# Patient Record
Sex: FEMALE | Race: OTHER | URBAN - METROPOLITAN AREA
[De-identification: names, ages, dates, MRNs, and addresses within clinical notes are randomized per-mention and may not be internally consistent; named-entity substitution may affect disease eponyms.]

---

## 2024-06-21 ENCOUNTER — NURSE TRIAGE (OUTPATIENT)
Dept: FAMILY MEDICINE CLINIC | Facility: CLINIC | Age: 19
End: 2024-06-21

## 2024-06-21 NOTE — TELEPHONE ENCOUNTER
Action Requested: Summary for Provider     []  Critical Lab, Recommendations Needed  [] Need Additional Advice  []   FYI    []   Need Orders  [] Need Medications Sent to Pharmacy  []  Other     SUMMARY: Office visit.   Future Appointments   Date Time Provider Department Center   6/27/2024  3:40 PM Dar Young APRN [facility]       Reason for call: Breathing Problem  Onset: Data Unavailable    Patient states that she has had random pain that is in her lungs for the last year. The pain comes and goes. She denies chest pain, shortness of breath, severe pain. Currently she denies symptoms.     Reason for Disposition   Patient wants to be seen    Protocols used: Breathing Difficulty-A-[place]

## 2024-06-21 NOTE — TELEPHONE ENCOUNTER
New patient scheduled an appointment on line noting the following:    I have been experiencing pain in the lungs when breathing and I want to get it checked out.     Left message to call back.  Mychart not set up.

## 2024-06-27 ENCOUNTER — OFFICE VISIT (OUTPATIENT)
Dept: FAMILY MEDICINE CLINIC | Facility: CLINIC | Age: 19
End: 2024-06-27

## 2024-06-27 VITALS
OXYGEN SATURATION: 98 % | BODY MASS INDEX: 18.43 KG/M2 | WEIGHT: 104 LBS | HEART RATE: 76 BPM | HEIGHT: 63 IN | DIASTOLIC BLOOD PRESSURE: 71 MMHG | SYSTOLIC BLOOD PRESSURE: 118 MMHG

## 2024-06-27 DIAGNOSIS — R10.12 LUQ PAIN: ICD-10-CM

## 2024-06-27 DIAGNOSIS — R07.81 PLEURITIC CHEST PAIN: Primary | ICD-10-CM

## 2024-06-27 DIAGNOSIS — R07.81 RIB PAIN ON LEFT SIDE: ICD-10-CM

## 2024-06-27 PROCEDURE — 99204 OFFICE O/P NEW MOD 45 MIN: CPT

## 2024-06-27 PROCEDURE — 3078F DIAST BP <80 MM HG: CPT

## 2024-06-27 PROCEDURE — 3008F BODY MASS INDEX DOCD: CPT

## 2024-06-27 PROCEDURE — 3074F SYST BP LT 130 MM HG: CPT

## 2024-06-27 NOTE — ASSESSMENT & PLAN NOTE
Left upper quadrant pain  Assess pancreas  Does not radiate to back  Does not feel like burning it is a sharp pain.  Does not get worse when laying flat.  Workup pending

## 2024-06-27 NOTE — ASSESSMENT & PLAN NOTE
Pleuritic-like chest pain is intermittent and not present on exam.  Patient reports has been happening for over a year happens with no rhyme or reason.  Patient reports she has sharp pain in the left lower ribs on last 2 ribs near costal cartilage above lung and stomach.  Not reproducible on exam  Denies use of OCPs, denies chest pain and shortness of breath  Denies issues with urination and blood  Will assess CBC, CMP, lipase, chest x-ray and D-dimer  Will await results for further recommendations

## 2024-06-27 NOTE — PROGRESS NOTES
Subjective:   Rin Garcia is a 19 year old female who presents for Lung Problem (Lung pain , past 1 year , on and off, left side behind her rib, when inhale is worse and hard to breathe, theres no reason for the cause of the pain that she remembers)   Patient is a pleasant 19-year-old female with no documented past medical history.  Patient is new to office today to provider.  Patient coming today to establish care and be evaluated for intermittent lung pain x 1 year.  Patient states she has lung pain on and off on left side. Below her rib. This has been going on for over one year. When it comes it hurts to breath.It doesn't go away until she can fully inhale. She can not associate it with anything. Mom has told her to have it looked at. No trauma, no fall, no injury of any sort. She is not on OCPs, she does not smoke. Pain does not happen with every breath. She cannot identify any associated factor. She denies back pain, denies bladder/urination issues, not associated with menses. Not reproducible on exam and not present on exam. Denies cough, denies wheeze, denies chest pain.     PMH: Depression. Does not take meds. Was on zoloft at 13 years.   PSH: No hx of surgeries.   Meds: none.         Past Medical History:    Depression      History reviewed. No pertinent surgical history.     History/Other:    Chief Complaint Reviewed and Verified  Nursing Notes Reviewed and   Verified  Tobacco Reviewed  Allergies Reviewed  Medications Reviewed    Problem List Reviewed  Medical History Reviewed  Surgical History   Reviewed  OB Status Reviewed  Family History Reviewed  Social History   Reviewed         Tobacco:  She has never smoked tobacco.    No current outpatient medications on file.         Review of Systems:  Review of Systems   Constitutional: Negative.  Negative for activity change, appetite change, chills and fever.   HENT: Negative.  Negative for congestion, postnasal drip, rhinorrhea, sore throat,  tinnitus and voice change.    Eyes: Negative.    Respiratory:  Positive for chest tightness. Negative for apnea, cough and shortness of breath.    Cardiovascular:  Negative for chest pain and leg swelling.   Gastrointestinal: Negative.  Negative for abdominal pain, anal bleeding, blood in stool, constipation, diarrhea, nausea and vomiting.   Genitourinary: Negative.  Negative for difficulty urinating, flank pain and menstrual problem.   Musculoskeletal: Negative.  Negative for joint swelling.   Skin: Negative.    Neurological: Negative.  Negative for dizziness and headaches.   Psychiatric/Behavioral: Negative.  Negative for agitation.          Objective:   /71 (BP Location: Left arm, Patient Position: Sitting, Cuff Size: adult)   Pulse 76   Ht 5' 3\" (1.6 m)   Wt 104 lb (47.2 kg)   LMP 06/02/2024   SpO2 98%   BMI 18.42 kg/m²  Estimated body mass index is 18.42 kg/m² as calculated from the following:    Height as of this encounter: 5' 3\" (1.6 m).    Weight as of this encounter: 104 lb (47.2 kg).  Physical Exam  Vitals and nursing note reviewed.   Constitutional:       General: She is not in acute distress.     Appearance: Normal appearance. She is well-developed.   HENT:      Head: Normocephalic and atraumatic.      Right Ear: Tympanic membrane, ear canal and external ear normal.      Left Ear: Tympanic membrane, ear canal and external ear normal.      Nose: Rhinorrhea present. No congestion.      Mouth/Throat:      Mouth: Mucous membranes are moist.      Pharynx: Oropharynx is clear. No oropharyngeal exudate or posterior oropharyngeal erythema.   Eyes:      Conjunctiva/sclera: Conjunctivae normal.      Pupils: Pupils are equal, round, and reactive to light.   Neck:      Thyroid: No thyromegaly.   Cardiovascular:      Rate and Rhythm: Normal rate and regular rhythm.      Pulses: Normal pulses.      Heart sounds: Normal heart sounds. No murmur heard.  Pulmonary:      Effort: Pulmonary effort is normal. No  respiratory distress.      Breath sounds: Normal breath sounds. No wheezing or rales.   Chest:      Chest wall: No tenderness.   Abdominal:      General: Bowel sounds are normal. There is no distension.      Palpations: Abdomen is soft. There is no mass.      Tenderness: There is no abdominal tenderness. There is no right CVA tenderness, left CVA tenderness, guarding or rebound.      Hernia: No hernia is present.   Musculoskeletal:         General: No swelling, tenderness or deformity. Normal range of motion.      Cervical back: Normal range of motion and neck supple.      Comments: Pain reported on last two ribs on left side right above lung/stomach area.  No step off  No injury  No flail chest  Not reproducible.    Lymphadenopathy:      Cervical: No cervical adenopathy.   Skin:     General: Skin is warm and dry.      Capillary Refill: Capillary refill takes less than 2 seconds.      Findings: No rash.   Neurological:      Mental Status: She is alert and oriented to person, place, and time.      Coordination: Coordination normal.   Psychiatric:         Behavior: Behavior normal.         Thought Content: Thought content normal.         Judgment: Judgment normal.           Assessment & Plan:   1. Pleuritic chest pain (Primary)  Assessment & Plan:  Pleuritic-like chest pain is intermittent and not present on exam.  Patient reports has been happening for over a year happens with no rhyme or reason.  Patient reports she has sharp pain in the left lower ribs on last 2 ribs near costal cartilage above lung and stomach.  Not reproducible on exam  Denies use of OCPs, denies chest pain and shortness of breath  Denies issues with urination and blood  Will assess CBC, CMP, lipase, chest x-ray and D-dimer  Will await results for further recommendations    Orders:  -     CBC With Differential With Platelet; Future; Expected date: 06/27/2024  -     Comp Metabolic Panel (14); Future; Expected date: 06/27/2024  -     D-Dimer;  Future; Expected date: 06/27/2024  -     XR CHEST PA + LAT CHEST (CPT=71046); Future; Expected date: 06/27/2024  2. LUQ pain  Assessment & Plan:  Left upper quadrant pain  Assess pancreas  Does not radiate to back  Does not feel like burning it is a sharp pain.  Does not get worse when laying flat.  Workup pending  Orders:  -     Comp Metabolic Panel (14); Future; Expected date: 06/27/2024  -     Lipase; Future; Expected date: 06/27/2024  3. Rib pain on left side  Assessment & Plan:  Pleuritic-like chest pain is intermittent and not present on exam.  Patient reports has been happening for over a year happens with no rhyme or reason.  Patient reports she has sharp pain in the left lower ribs on last 2 ribs near costal cartilage above lung and stomach.  Not reproducible on exam  Denies use of OCPs, denies chest pain and shortness of breath  Denies issues with urination and blood  Will assess CBC, CMP, lipase, chest x-ray and D-dimer  Will await results for further recommendations    Orders:  -     XR CHEST PA + LAT CHEST (CPT=71046); Future; Expected date: 06/27/2024        Return for Annual physical.    KYARA Galvan, 6/27/2024, 7:52 AM